# Patient Record
Sex: MALE | Race: WHITE | NOT HISPANIC OR LATINO | Employment: OTHER | URBAN - METROPOLITAN AREA
[De-identification: names, ages, dates, MRNs, and addresses within clinical notes are randomized per-mention and may not be internally consistent; named-entity substitution may affect disease eponyms.]

---

## 2023-05-02 ENCOUNTER — TELEPHONE (OUTPATIENT)
Dept: OPHTHALMOLOGY | Facility: CLINIC | Age: 63
End: 2023-05-02
Payer: MEDICAID

## 2023-05-02 NOTE — TELEPHONE ENCOUNTER
----- Message from Alisa Banerjee sent at 5/2/2023  9:06 AM CDT -----  Contact: pt @ [phone]  Eran Dong calling regarding Patient Advice (message) for #pt returning call to [facility] pollock did called  and they forgot to cancel referral so disregard referral for Dr. Roberson , asking for call back

## 2023-05-02 NOTE — TELEPHONE ENCOUNTER
Spoke with pt to schedule appt, pt states he called his referring doctor and referral was an error. I will scan referral to chart just in case referral is needed in the future.